# Patient Record
Sex: FEMALE | Race: WHITE | NOT HISPANIC OR LATINO | Employment: FULL TIME | URBAN - METROPOLITAN AREA
[De-identification: names, ages, dates, MRNs, and addresses within clinical notes are randomized per-mention and may not be internally consistent; named-entity substitution may affect disease eponyms.]

---

## 2019-10-09 ENCOUNTER — OFFICE VISIT (OUTPATIENT)
Dept: OBGYN CLINIC | Facility: CLINIC | Age: 29
End: 2019-10-09
Payer: COMMERCIAL

## 2019-10-09 ENCOUNTER — APPOINTMENT (OUTPATIENT)
Dept: RADIOLOGY | Facility: CLINIC | Age: 29
End: 2019-10-09
Payer: COMMERCIAL

## 2019-10-09 VITALS
HEIGHT: 65 IN | SYSTOLIC BLOOD PRESSURE: 110 MMHG | DIASTOLIC BLOOD PRESSURE: 68 MMHG | WEIGHT: 143 LBS | HEART RATE: 72 BPM | BODY MASS INDEX: 23.82 KG/M2

## 2019-10-09 DIAGNOSIS — M25.561 RIGHT KNEE PAIN, UNSPECIFIED CHRONICITY: ICD-10-CM

## 2019-10-09 DIAGNOSIS — M76.31 ILIOTIBIAL BAND SYNDROME OF RIGHT SIDE: Primary | ICD-10-CM

## 2019-10-09 PROCEDURE — 20610 DRAIN/INJ JOINT/BURSA W/O US: CPT | Performed by: ORTHOPAEDIC SURGERY

## 2019-10-09 PROCEDURE — 73564 X-RAY EXAM KNEE 4 OR MORE: CPT

## 2019-10-09 PROCEDURE — 99203 OFFICE O/P NEW LOW 30 MIN: CPT | Performed by: ORTHOPAEDIC SURGERY

## 2019-10-09 RX ORDER — BETAMETHASONE SODIUM PHOSPHATE AND BETAMETHASONE ACETATE 3; 3 MG/ML; MG/ML
12 INJECTION, SUSPENSION INTRA-ARTICULAR; INTRALESIONAL; INTRAMUSCULAR; SOFT TISSUE
Status: COMPLETED | OUTPATIENT
Start: 2019-10-09 | End: 2019-10-09

## 2019-10-09 RX ORDER — LORATADINE 10 MG/1
10 TABLET ORAL DAILY
COMMUNITY

## 2019-10-09 RX ORDER — LIDOCAINE HYDROCHLORIDE 10 MG/ML
1 INJECTION, SOLUTION EPIDURAL; INFILTRATION; INTRACAUDAL; PERINEURAL
Status: COMPLETED | OUTPATIENT
Start: 2019-10-09 | End: 2019-10-09

## 2019-10-09 RX ORDER — OMEPRAZOLE 20 MG/1
20 CAPSULE, DELAYED RELEASE ORAL DAILY
COMMUNITY

## 2019-10-09 RX ADMIN — BETAMETHASONE SODIUM PHOSPHATE AND BETAMETHASONE ACETATE 12 MG: 3; 3 INJECTION, SUSPENSION INTRA-ARTICULAR; INTRALESIONAL; INTRAMUSCULAR; SOFT TISSUE at 13:04

## 2019-10-09 RX ADMIN — LIDOCAINE HYDROCHLORIDE 1 ML: 10 INJECTION, SOLUTION EPIDURAL; INFILTRATION; INTRACAUDAL; PERINEURAL at 13:04

## 2019-10-09 NOTE — PROGRESS NOTES
Assessment:     1  Iliotibial band syndrome of right side    2  Right knee pain, unspecified chronicity        Plan:      Problem List Items Addressed This Visit        Musculoskeletal and Integument    Iliotibial band syndrome of right side - Primary     Findings consistent with right iliotibial band syndrome  Discussed findings and treatment options with the patient  I reviewed patient's right knee x-ray with her  I discussed prognosis of her condition  I provided patient CSI injection distal ITB at lateral epicondyle, given stretches to perform on daily basis  Ice knee over next few days  Avoid repetitive stairs, running, she can walk to tolerance  Home exercise sheets given to patient today  It can take some time for ITB symptoms to resolve  See in 6 weeks if pain has resolved cancel appt  All patient's questions were answered to her satisfaction  This note is created using dictation transcription  It may contain typographical errors, grammatical errors, improperly dictated words, background noise and other errors  Relevant Medications    lidocaine (PF) (XYLOCAINE-MPF) 1 % injection 1 mL (Completed)    betamethasone acetate-betamethasone sodium phosphate (CELESTONE) injection 12 mg (Completed)    Other Relevant Orders    Large joint arthrocentesis (Completed)      Other Visit Diagnoses     Right knee pain, unspecified chronicity        Relevant Orders    XR knee 4+ vw right injury         Subjective:     Patient ID: Angelica Naidu is a 34 y o  female  Chief Complaint:  34year old female in for evaluation of right knee pain  Pain started a month ago, worse the past 2 weeks  No associated injury or trauma  She notes lateral knee pain  No locking, catching or swelling  Daily cracking and popping in her knee  She did recently have a baby  She is constantly squatting, kneeling, lifting baby  Twisting motions also bother her knee  Denies numbness or tingling in leg    Information on patient's intake form was reviewed  Allergy:  Allergies   Allergen Reactions    Penicillins Hives     Medications:  all current active meds have been reviewed  Past Medical History:  History reviewed  No pertinent past medical history  Past Surgical History:  Past Surgical History:   Procedure Laterality Date    KNEE SURGERY Left 2012    arthroscopy    TONSILLECTOMY AND ADENOIDECTOMY       Family History:  Family History   Problem Relation Age of Onset    No Known Problems Mother     No Known Problems Father      Social History:  Social History     Substance and Sexual Activity   Alcohol Use Yes    Comment: sometimes     Social History     Substance and Sexual Activity   Drug Use Not on file     Social History     Tobacco Use   Smoking Status Never Smoker   Smokeless Tobacco Never Used     Review of Systems   Constitutional: Negative for chills and fever  HENT: Negative for drooling and sneezing  Eyes: Negative for redness  Respiratory: Negative for cough and wheezing  Cardiovascular: Negative  Gastrointestinal: Negative for nausea and vomiting  Genitourinary: Negative  Musculoskeletal: Positive for arthralgias (Right knee)  Negative for gait problem and joint swelling  Neurological: Negative  Psychiatric/Behavioral: The patient is not nervous/anxious  Objective:  BP Readings from Last 1 Encounters:   10/09/19 110/68      Wt Readings from Last 1 Encounters:   10/09/19 64 9 kg (143 lb)      BMI:   Estimated body mass index is 23 8 kg/m² as calculated from the following:    Height as of this encounter: 5' 5" (1 651 m)  Weight as of this encounter: 64 9 kg (143 lb)  BSA:   Estimated body surface area is 1 72 meters squared as calculated from the following:    Height as of this encounter: 5' 5" (1 651 m)  Weight as of this encounter: 64 9 kg (143 lb)  Physical Exam   Constitutional: She is oriented to person, place, and time  She appears well-developed and well-nourished     HENT: Head: Normocephalic and atraumatic  Eyes: Conjunctivae and EOM are normal    Neck: Neck supple  Pulmonary/Chest: Effort normal    Musculoskeletal:        Right knee: She exhibits no effusion  Neurological: She is alert and oriented to person, place, and time  She has normal reflexes  Skin: Skin is warm and dry  Psychiatric: She has a normal mood and affect  Her behavior is normal  Judgment and thought content normal    Nursing note and vitals reviewed  Right Knee Exam     Muscle Strength   The patient has normal right knee strength  Tenderness   Right knee tenderness location: distal ITB over lateral epicondyle  Range of Motion   The patient has normal right knee ROM  Extension: 0   Flexion: 140     Tests   Derek:  Medial - negative Lateral - negative  Varus: negative Valgus: negative  Patellar apprehension: negative    Other   Erythema: absent  Scars: absent  Sensation: normal  Pulse: present  Swelling: none  Effusion: no effusion present    Comments: Well tracking patella with crepitation             I have personally reviewed pertinent films in PACS and my interpretation is no significant degenerative arthritis, no osseus abnormalities  Large joint arthrocentesis  Date/Time: 10/9/2019 1:04 PM  Consent given by: patient  Site marked: site marked  Timeout: Immediately prior to procedure a time out was called to verify the correct patient, procedure, equipment, support staff and site/side marked as required   Supporting Documentation  Indications: pain   Procedure Details  Location: knee - Knee joint: distal ITB    Preparation: Patient was prepped and draped in the usual sterile fashion  Needle size: 22 G  Ultrasound guidance: no  Approach: lateral  Medications administered: 1 mL lidocaine (PF) 1 %; 12 mg betamethasone acetate-betamethasone sodium phosphate 6 (3-3) mg/mL    Patient tolerance: patient tolerated the procedure well with no immediate complications  Dressing:  Sterile dressing applied          Scribe Attestation    I,:   Chelsie Kearns am acting as a scribe while in the presence of the attending physician :        I,:   Francisco J Gomez MD personally performed the services described in this documentation    as scribed in my presence :

## 2019-10-09 NOTE — ASSESSMENT & PLAN NOTE
Findings consistent with right iliotibial band syndrome  Discussed findings and treatment options with the patient  I reviewed patient's right knee x-ray with her  I discussed prognosis of her condition  I provided patient CSI injection distal ITB at lateral epicondyle, given stretches to perform on daily basis  Ice knee over next few days  Avoid repetitive stairs, running, she can walk to tolerance  Home exercise sheets given to patient today  It can take some time for ITB symptoms to resolve  See in 6 weeks if pain has resolved cancel appt  All patient's questions were answered to her satisfaction  This note is created using dictation transcription  It may contain typographical errors, grammatical errors, improperly dictated words, background noise and other errors

## 2021-10-04 ENCOUNTER — OFFICE VISIT (OUTPATIENT)
Dept: OBGYN CLINIC | Facility: CLINIC | Age: 31
End: 2021-10-04
Payer: COMMERCIAL

## 2021-10-04 VITALS
BODY MASS INDEX: 23.82 KG/M2 | SYSTOLIC BLOOD PRESSURE: 122 MMHG | DIASTOLIC BLOOD PRESSURE: 70 MMHG | HEIGHT: 65 IN | WEIGHT: 143 LBS

## 2021-10-04 DIAGNOSIS — M79.645 PAIN OF LEFT THUMB: Primary | ICD-10-CM

## 2021-10-04 DIAGNOSIS — R20.2 PARESTHESIA OF SKIN: ICD-10-CM

## 2021-10-04 PROCEDURE — 99213 OFFICE O/P EST LOW 20 MIN: CPT | Performed by: FAMILY MEDICINE

## 2021-10-04 RX ORDER — PANTOPRAZOLE SODIUM 20 MG/1
20 TABLET, DELAYED RELEASE ORAL DAILY
COMMUNITY

## 2022-04-01 ENCOUNTER — OFFICE VISIT (OUTPATIENT)
Dept: OBGYN CLINIC | Facility: CLINIC | Age: 32
End: 2022-04-01
Payer: COMMERCIAL

## 2022-04-01 ENCOUNTER — APPOINTMENT (OUTPATIENT)
Dept: RADIOLOGY | Facility: CLINIC | Age: 32
End: 2022-04-01
Payer: COMMERCIAL

## 2022-04-01 VITALS
DIASTOLIC BLOOD PRESSURE: 68 MMHG | HEIGHT: 65 IN | BODY MASS INDEX: 20.83 KG/M2 | SYSTOLIC BLOOD PRESSURE: 112 MMHG | WEIGHT: 125 LBS

## 2022-04-01 DIAGNOSIS — M25.561 RIGHT KNEE PAIN, UNSPECIFIED CHRONICITY: ICD-10-CM

## 2022-04-01 DIAGNOSIS — M25.561 ACUTE PAIN OF RIGHT KNEE: Primary | ICD-10-CM

## 2022-04-01 PROBLEM — M76.31 ILIOTIBIAL BAND SYNDROME OF RIGHT SIDE: Status: RESOLVED | Noted: 2019-10-09 | Resolved: 2022-04-01

## 2022-04-01 PROCEDURE — 73564 X-RAY EXAM KNEE 4 OR MORE: CPT

## 2022-04-01 PROCEDURE — 99213 OFFICE O/P EST LOW 20 MIN: CPT | Performed by: ORTHOPAEDIC SURGERY

## 2022-04-01 NOTE — ASSESSMENT & PLAN NOTE
Findings today are consistent with acute right knee pain possible right knee meniscal injury  Imaging and prognosis was reviewed with patient today  Patient does not have any mechanical symptoms at present time  I recommended trial of physical therapy and see if her symptoms reoccurs  A script for PT was placed today  If her symptoms return despite therapy, then I would recommend MRI of her right knee  Patient should follow up in 6-8 weeks  All patient's questions were answered to her satisfaction  This note is created using dictation transcription  It may contain typographical errors, grammatical errors, improperly dictated words, background noise and other errors

## 2022-04-01 NOTE — PROGRESS NOTES
Assessment:     1  Acute pain of right knee        Plan:     Problem List Items Addressed This Visit        Other    Acute pain of right knee - Primary     Findings today are consistent with acute right knee pain possible right knee meniscal injury  Imaging and prognosis was reviewed with patient today  Patient does not have any mechanical symptoms at present time  I recommended trial of physical therapy and see if her symptoms reoccurs  A script for PT was placed today  If her symptoms return despite therapy, then I would recommend MRI of her right knee  Patient should follow up in 6-8 weeks  All patient's questions were answered to her satisfaction  This note is created using dictation transcription  It may contain typographical errors, grammatical errors, improperly dictated words, background noise and other errors  Relevant Orders    XR knee 4+ vw right injury    Ambulatory Referral to Physical Therapy         Subjective:     Patient ID: Sandra Mercedes is a 32 y o  female  Chief Complaint:  32year old presents today with right knee pain  Patient states she was kneeling about a week and a half ago and her knee locked up- had to manually release her knee  She states this has happened previously as well when she was younger  She has past history of left knee bucket handle meniscal tear/displacement- surgically repaired 10 years ago  Patient states this injury feels similar  Patient states her pain at the time of injury was sharp and achy located posterior knee  She took tylenol and iced the day of injury- has not had pain since injury  Patient notes that he knee at times feels unstable  She denies locking or giving way sensations after the initial episode  Patient was previously seen in 2019 for right IT Band Syndrome  A cortisone injection was administered at that time  Information on patient's intake form was reviewed        Allergy:  Allergies   Allergen Reactions    Penicillins Hives Medications:  all current active meds have been reviewed  Past Medical History:  Past Medical History:   Diagnosis Date    Iliotibial band syndrome of right side 10/9/2019     Past Surgical History:  Past Surgical History:   Procedure Laterality Date    KNEE SURGERY Left 2012    arthroscopy    TONSILLECTOMY AND ADENOIDECTOMY       Family History:  Family History   Problem Relation Age of Onset    No Known Problems Mother     No Known Problems Father      Social History:  Social History     Substance and Sexual Activity   Alcohol Use Yes    Comment: sometimes     Social History     Substance and Sexual Activity   Drug Use Not on file     Social History     Tobacco Use   Smoking Status Never Smoker   Smokeless Tobacco Never Used     Review of Systems   Constitutional: Negative for chills and fever  HENT: Negative for ear pain and sore throat  Eyes: Negative for pain and visual disturbance  Respiratory: Negative for cough and shortness of breath  Cardiovascular: Negative for chest pain and palpitations  Gastrointestinal: Negative for abdominal pain and vomiting  Genitourinary: Negative for dysuria and hematuria  Musculoskeletal: Negative for arthralgias, back pain, gait problem and joint swelling  Skin: Negative for color change and rash  Neurological: Negative for seizures and syncope  Psychiatric/Behavioral: Negative  All other systems reviewed and are negative  Objective:  BP Readings from Last 1 Encounters:   04/01/22 112/68      Wt Readings from Last 1 Encounters:   04/01/22 56 7 kg (125 lb)      BMI:   Estimated body mass index is 20 8 kg/m² as calculated from the following:    Height as of this encounter: 5' 5" (1 651 m)  Weight as of this encounter: 56 7 kg (125 lb)  BSA:   Estimated body surface area is 1 62 meters squared as calculated from the following:    Height as of this encounter: 5' 5" (1 651 m)  Weight as of this encounter: 56 7 kg (125 lb)     Physical Exam  Vitals and nursing note reviewed  Constitutional:       Appearance: Normal appearance  She is well-developed  HENT:      Head: Normocephalic and atraumatic  Right Ear: External ear normal       Left Ear: External ear normal    Eyes:      Extraocular Movements: Extraocular movements intact  Conjunctiva/sclera: Conjunctivae normal    Pulmonary:      Effort: Pulmonary effort is normal    Musculoskeletal:      Cervical back: Neck supple  Right knee: No effusion  Instability Tests: Medial Derek test negative and lateral Derek test negative  Skin:     General: Skin is warm and dry  Neurological:      Mental Status: She is alert and oriented to person, place, and time  Deep Tendon Reflexes: Reflexes are normal and symmetric  Psychiatric:         Mood and Affect: Mood normal          Behavior: Behavior normal        Right Knee Exam     Muscle Strength   The patient has normal right knee strength  Tenderness   The patient is experiencing no tenderness  Range of Motion   Extension: normal   Flexion: normal     Tests   Derek:  Medial - negative Lateral - negative  Varus: negative Valgus: negative  Lachman:  Anterior - negative    Posterior - negative  Drawer:  Anterior - negative    Posterior - negative  Pivot shift: negative  Patellar apprehension: negative    Other   Erythema: absent  Scars: absent  Sensation: normal  Pulse: present  Swelling: none  Effusion: no effusion present            I have personally reviewed pertinent films in PACS and my interpretation is x-ray of right knee demonstrates no acute osseous abnormalities       Scribe Attestation    I,:  Diego Campbell am acting as a scribe while in the presence of the attending physician :       I,:  Ebony Burton MD personally performed the services described in this documentation    as scribed in my presence :

## 2022-04-11 ENCOUNTER — TELEPHONE (OUTPATIENT)
Dept: OBGYN CLINIC | Facility: HOSPITAL | Age: 32
End: 2022-04-11

## 2022-04-11 NOTE — TELEPHONE ENCOUNTER
Patient is calling to request that we print and manually fax her Right Knee PT order to [facility] in [place] [address] at   Patient has her initial evaluation at 12 PM today      \[facility]\"" [phone]